# Patient Record
Sex: FEMALE
[De-identification: names, ages, dates, MRNs, and addresses within clinical notes are randomized per-mention and may not be internally consistent; named-entity substitution may affect disease eponyms.]

---

## 2021-02-22 ENCOUNTER — HOSPITAL ENCOUNTER (OUTPATIENT)
Dept: HOSPITAL 92 - SCSRAD | Age: 86
Discharge: HOME | End: 2021-02-22
Attending: NURSE PRACTITIONER
Payer: MEDICARE

## 2021-02-22 DIAGNOSIS — S32.501A: ICD-10-CM

## 2021-02-22 DIAGNOSIS — M79.661: Primary | ICD-10-CM

## 2021-02-22 PROCEDURE — 72170 X-RAY EXAM OF PELVIS: CPT

## 2021-02-22 NOTE — RAD
TWO VIEWS RIGHT FEMUR:

 

History: Fall 10 days ago with pain in the right leg. 

 

FINDINGS: 

Two views of the right femur shows no evidence of fracture or dislocation of the femur. There are fra
ctures of the right superior and inferior pubic rami. Surgical clips are seen in the pelvis. 

 

IMPRESSION: 

No evidence of acute osseous abnormality of the femur. 

 

POS: [facility]

## 2021-02-22 NOTE — RAD
AP view of the pelvis



INDICATION: History of fall with pelvic pain



COMPARISON: None.



FINDINGS:



Bones: There is a mildly displaced, comminuted fracture involving the right superior pubic ramus, rig
ht pubic body and right inferior pubic ramus. No additional fracture is grossly evident. There is

diffuse osteopenia.



Hips: Intact. 



SI joints and symphysis pubis: Normal appearing.



Intrapelvic contents: Small surgical clips are seen within the lower left hemipelvis.



IMPRESSION: Right pubic body and right obturator ring fracture. Would recommend consideration for a f
ollow-up CT or MRI of the pelvis to evaluate for possible posterior pelvic fractures which can

sometimes be difficult to view on the radiograph due to osteopenia and overlying bowel gas.



Reported By: Oneal Maria 

Electronically Signed:  2/22/2021 5:33 PM

## 2021-02-22 NOTE — RAD
XR Knee Rt 2 View:



 2/22/2021 4:50 PM



CLINICAL INDICATION: Fall with right knee pain



COMPARISON: None.



FINDINGS:



Bones:  There is diffuse osteopenia. No acute fractures evident. 



Joints: There is mild marginal osteophytes affecting the major compartments of the right knee.. 



Soft Tissue: No acute abnormality..

  

IMPRESSION: 

No acute osseous abnormality.. 



Reported By: Oneal Maria 

Electronically Signed:  2/22/2021 5:31 PM